# Patient Record
Sex: MALE | Race: WHITE | NOT HISPANIC OR LATINO | Employment: OTHER | URBAN - METROPOLITAN AREA
[De-identification: names, ages, dates, MRNs, and addresses within clinical notes are randomized per-mention and may not be internally consistent; named-entity substitution may affect disease eponyms.]

---

## 2017-01-01 ENCOUNTER — HOSPITAL ENCOUNTER (OUTPATIENT)
Dept: RADIOLOGY | Facility: HOSPITAL | Age: 82
Discharge: HOME | End: 2017-03-09
Attending: NURSE PRACTITIONER
Payer: MEDICARE

## 2017-01-01 ENCOUNTER — TELEPHONE (OUTPATIENT)
Dept: FAMILY MEDICINE | Facility: CLINIC | Age: 82
End: 2017-01-01

## 2017-01-01 ENCOUNTER — OFFICE VISIT (OUTPATIENT)
Dept: FAMILY MEDICINE | Facility: CLINIC | Age: 82
End: 2017-01-01
Payer: MEDICARE

## 2017-01-01 ENCOUNTER — TELEPHONE (OUTPATIENT)
Dept: NEUROLOGY | Facility: CLINIC | Age: 82
End: 2017-01-01

## 2017-01-01 ENCOUNTER — OFFICE VISIT (OUTPATIENT)
Dept: NEUROLOGY | Facility: CLINIC | Age: 82
End: 2017-01-01
Payer: MEDICARE

## 2017-01-01 ENCOUNTER — LAB VISIT (OUTPATIENT)
Dept: LAB | Facility: HOSPITAL | Age: 82
End: 2017-01-01
Attending: EMERGENCY MEDICINE
Payer: MEDICARE

## 2017-01-01 VITALS
HEIGHT: 68 IN | HEART RATE: 76 BPM | WEIGHT: 168.19 LBS | TEMPERATURE: 98 F | SYSTOLIC BLOOD PRESSURE: 114 MMHG | DIASTOLIC BLOOD PRESSURE: 66 MMHG | OXYGEN SATURATION: 99 % | BODY MASS INDEX: 25.49 KG/M2

## 2017-01-01 VITALS
WEIGHT: 171 LBS | HEART RATE: 77 BPM | DIASTOLIC BLOOD PRESSURE: 68 MMHG | SYSTOLIC BLOOD PRESSURE: 122 MMHG | BODY MASS INDEX: 23.16 KG/M2 | HEIGHT: 72 IN

## 2017-01-01 VITALS
BODY MASS INDEX: 23.05 KG/M2 | SYSTOLIC BLOOD PRESSURE: 100 MMHG | TEMPERATURE: 98 F | HEART RATE: 69 BPM | WEIGHT: 170.19 LBS | HEIGHT: 72 IN | DIASTOLIC BLOOD PRESSURE: 60 MMHG

## 2017-01-01 DIAGNOSIS — R30.0 DYSURIA: Primary | ICD-10-CM

## 2017-01-01 DIAGNOSIS — I48.0 PAROXYSMAL ATRIAL FIBRILLATION: ICD-10-CM

## 2017-01-01 DIAGNOSIS — R30.9 MICTURITION PAINFUL: ICD-10-CM

## 2017-01-01 DIAGNOSIS — F32.A DEPRESSION: ICD-10-CM

## 2017-01-01 DIAGNOSIS — G31.83 LEWY BODY DEMENTIA WITHOUT BEHAVIORAL DISTURBANCE: ICD-10-CM

## 2017-01-01 DIAGNOSIS — M25.521 PAIN IN RIGHT ELBOW: ICD-10-CM

## 2017-01-01 DIAGNOSIS — R60.0 LOCALIZED EDEMA: ICD-10-CM

## 2017-01-01 DIAGNOSIS — G20.A1 PARKINSON DISEASE: ICD-10-CM

## 2017-01-01 DIAGNOSIS — M25.421 SWELLING OF RIGHT ELBOW: ICD-10-CM

## 2017-01-01 DIAGNOSIS — G20.A1 DEMENTIA DUE TO PARKINSON'S DISEASE WITHOUT BEHAVIORAL DISTURBANCE: ICD-10-CM

## 2017-01-01 DIAGNOSIS — F02.80 LEWY BODY DEMENTIA WITHOUT BEHAVIORAL DISTURBANCE: ICD-10-CM
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
## 2017-01-01 DIAGNOSIS — G20.A1 COGNITIVE DEFICIT DUE TO PARKINSON'S DISEASE: ICD-10-CM

## 2017-01-01 DIAGNOSIS — F02.80 DEMENTIA DUE TO PARKINSON'S DISEASE WITHOUT BEHAVIORAL DISTURBANCE: ICD-10-CM

## 2017-01-01 DIAGNOSIS — S40.021A TRAUMATIC HEMATOMA OF RIGHT UPPER ARM, INITIAL ENCOUNTER: Primary | ICD-10-CM

## 2017-01-01 DIAGNOSIS — R53.81 PHYSICAL DEBILITY: ICD-10-CM

## 2017-01-01 DIAGNOSIS — S40.021A TRAUMATIC HEMATOMA OF RIGHT UPPER ARM, INITIAL ENCOUNTER: ICD-10-CM

## 2017-01-01 DIAGNOSIS — R47.01 PROGRESSIVE APHASIA: ICD-10-CM

## 2017-01-01 LAB
BACTERIA UR CULT: NO GROWTH
BILIRUB UR QL STRIP: NEGATIVE
CLARITY UR: CLEAR
COLOR UR: YELLOW
GLUCOSE UR QL STRIP: NEGATIVE
HGB UR QL STRIP: NEGATIVE
KETONES UR QL STRIP: NEGATIVE
LEUKOCYTE ESTERASE UR QL STRIP: NEGATIVE
NITRITE UR QL STRIP: NEGATIVE
PH UR STRIP: 7 [PH] (ref 5–8)
PROT UR QL STRIP: NEGATIVE
SP GR UR STRIP: 1.01 (ref 1–1.03)
URN SPEC COLLECT METH UR: NORMAL

## 2017-01-01 PROCEDURE — 1160F RVW MEDS BY RX/DR IN RCRD: CPT | Mod: S$GLB,,, | Performed by: NURSE PRACTITIONER

## 2017-01-01 PROCEDURE — 99999 PR PBB SHADOW E&M-EST. PATIENT-LVL II: CPT | Mod: PBBFAC,,, | Performed by: PSYCHIATRY & NEUROLOGY

## 2017-01-01 PROCEDURE — 99214 OFFICE O/P EST MOD 30 MIN: CPT | Mod: S$GLB,,, | Performed by: NURSE PRACTITIONER

## 2017-01-01 PROCEDURE — 76882 US LMTD JT/FCL EVL NVASC XTR: CPT | Mod: 26,59,RT, | Performed by: RADIOLOGY

## 2017-01-01 PROCEDURE — 1157F ADVNC CARE PLAN IN RCRD: CPT | Mod: S$GLB,,, | Performed by: NURSE PRACTITIONER

## 2017-01-01 PROCEDURE — 81003 URINALYSIS AUTO W/O SCOPE: CPT | Mod: PO

## 2017-01-01 PROCEDURE — 1126F AMNT PAIN NOTED NONE PRSNT: CPT | Mod: S$GLB,,, | Performed by: NURSE PRACTITIONER

## 2017-01-01 PROCEDURE — 99214 OFFICE O/P EST MOD 30 MIN: CPT | Mod: GW,S$GLB,, | Performed by: PSYCHIATRY & NEUROLOGY

## 2017-01-01 PROCEDURE — 99999 PR PBB SHADOW E&M-EST. PATIENT-LVL IV: CPT | Mod: PBBFAC,,, | Performed by: NURSE PRACTITIONER

## 2017-01-01 PROCEDURE — 87086 URINE CULTURE/COLONY COUNT: CPT

## 2017-01-01 PROCEDURE — 76882 US LMTD JT/FCL EVL NVASC XTR: CPT | Mod: TC,PO,RT

## 2017-01-01 PROCEDURE — 1159F MED LIST DOCD IN RCRD: CPT | Mod: S$GLB,,, | Performed by: NURSE PRACTITIONER

## 2017-01-01 PROCEDURE — 93971 EXTREMITY STUDY: CPT | Mod: 26,RT,, | Performed by: RADIOLOGY

## 2017-01-01 PROCEDURE — 93971 EXTREMITY STUDY: CPT | Mod: TC,PO,RT

## 2017-01-01 PROCEDURE — 1159F MED LIST DOCD IN RCRD: CPT | Mod: S$GLB,,, | Performed by: PSYCHIATRY & NEUROLOGY

## 2017-01-01 PROCEDURE — 1126F AMNT PAIN NOTED NONE PRSNT: CPT | Mod: S$GLB,,, | Performed by: PSYCHIATRY & NEUROLOGY

## 2017-01-01 PROCEDURE — 99499 UNLISTED E&M SERVICE: CPT | Mod: S$GLB,,, | Performed by: NURSE PRACTITIONER

## 2017-01-01 PROCEDURE — 99499 UNLISTED E&M SERVICE: CPT | Mod: S$GLB,,, | Performed by: PSYCHIATRY & NEUROLOGY

## 2017-01-01 RX ORDER — CITALOPRAM 20 MG/1
TABLET, FILM COATED ORAL
Qty: 90 TABLET | Refills: 0 | Status: SHIPPED | OUTPATIENT
Start: 2017-01-01

## 2017-01-01 RX ORDER — MUPIROCIN 20 MG/G
OINTMENT TOPICAL
Refills: 0 | COMMUNITY
Start: 2017-01-01 | End: 2017-01-01 | Stop reason: SDUPTHER

## 2017-01-01 RX ORDER — LATANOPROST 50 UG/ML
1 SOLUTION/ DROPS OPHTHALMIC NIGHTLY
COMMUNITY
Start: 2017-01-01

## 2017-01-01 RX ORDER — CARBIDOPA AND LEVODOPA 25; 250 MG/1; MG/1
TABLET ORAL
Qty: 270 TABLET | Refills: 0 | Status: SHIPPED | OUTPATIENT
Start: 2017-01-01 | End: 2017-01-01 | Stop reason: SDUPTHER

## 2017-01-01 RX ORDER — CARBIDOPA AND LEVODOPA 25; 250 MG/1; MG/1
1 TABLET ORAL 3 TIMES DAILY
Qty: 270 TABLET | Refills: 3 | Status: SHIPPED | OUTPATIENT
Start: 2017-01-01

## 2017-01-01 RX ORDER — CITALOPRAM 20 MG/1
TABLET, FILM COATED ORAL
Qty: 90 TABLET | Refills: 0 | Status: SHIPPED | OUTPATIENT
Start: 2017-01-01 | End: 2017-01-01 | Stop reason: SDUPTHER

## 2017-03-09 NOTE — PROGRESS NOTES
Subjective:       Patient ID: Gerardo Harding is a 81 y.o. male.    Chief Complaint: Hospital f/u (Patient fell 2 days ago and hit his right elbow,was sent to ER. Now elbow is swollen)    HPI Comments: 81 year old male with hx of dementia presents with swelling and abrasion to R lower arm after falling this afternoon. Wife states pt lost his balance while walking and landed on his R side. Pt c/o R arm pain. No other injuries. Presents with full ROM of R arm. Pt takes 325mg ASA daily. Last tetanus unknown.    Went down step at home - and veered to right and fell on his wife couldn't get him up, was going to Dr Foster office - he told his wife that his elbow hurt -  Went to [facility] and reviewed records from there.  They xrayed it showing no break   Wife has been putting bandaid on cut on elbow   Taking advil for the pain     Vitals:    03/09/17 1339   BP: 100/60   Pulse: 69   Temp: 98.4 °F (36.9 °C)     Review of Systems   Constitutional: Negative.  Negative for fatigue and fever.   HENT: Negative.    Eyes: Negative.    Respiratory: Negative.  Negative for cough and shortness of breath.    Cardiovascular: Negative.  Negative for chest pain.   Gastrointestinal: Negative.  Negative for abdominal pain, diarrhea and nausea.   Endocrine: Negative.    Genitourinary: Negative.  Negative for dysuria and hematuria.   Musculoskeletal: Positive for gait problem, joint swelling and myalgias.   Skin: Positive for wound. Negative for color change and rash.   Allergic/Immunologic: Negative.    Neurological: Positive for speech difficulty and light-headedness. Negative for numbness.   Hematological: Negative.    Psychiatric/Behavioral: Negative.        Past Medical History:   Diagnosis Date    *Atrial fibrillation     Atrial fibrillation     Glaucoma     Gout     Lewy body dementia     Parkinson disease     Lewy Body dementia     Objective:      Physical Exam   Constitutional: He is oriented to person, place, and time. He appears  well-developed.   HENT:   Head: Normocephalic and atraumatic.   Right Ear: External ear normal.   Left Ear: External ear normal.   Nose: Nose normal.   Mouth/Throat: Oropharynx is clear and moist.   Eyes: Conjunctivae and EOM are normal. Pupils are equal, round, and reactive to light.   Neck: Normal range of motion. Neck supple.   Cardiovascular: Normal rate, regular rhythm, normal heart sounds and intact distal pulses.  Exam reveals no friction rub.    No murmur heard.  Pulmonary/Chest: Effort normal and breath sounds normal. No respiratory distress. He has no wheezes.   Abdominal: Soft. Bowel sounds are normal.   Musculoskeletal:        Left elbow: He exhibits decreased range of motion, swelling and effusion. Tenderness found.        Left forearm: He exhibits tenderness, swelling and edema.   Difficulty with ambulation and frequent falls due to parkinson     See picture of left elbow traumatic hematoma    Neurological: He is alert and oriented to person, place, and time.   Skin: Skin is warm and dry.   Psychiatric: He has a normal mood and affect. His behavior is normal. Thought content normal.   Nursing note and vitals reviewed.              Assessment:       1. Traumatic hematoma of right upper arm, initial encounter    2. Pain in right elbow    3. Swelling of right elbow    4. Localized edema     5. Parkinson disease    6. 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 Physical debility    8. Dementia due to Parkinson's disease without behavioral disturbance    9. Paroxysmal atrial fibrillation        Plan:       Traumatic hematoma of right upper arm, initial encounter  -      Upper Extremity Veins Right; Future; Expected date: 3/9/17  -     US Extremity Non Vascular Limited Right; Future; Expected date: 3/9/17  -     Ambulatory referral to Home Health    Pain in right elbow  -      Upper Extremity Veins Right; Future; Expected date: 3/9/17  -     US Extremity Non Vascular Limited Right; Future;  Expected date: 3/9/17  -     Ambulatory referral to Home Health    Swelling of right elbow  -     US Upper Extremity Veins Right; Future; Expected date: 3/9/17  -     US Extremity Non Vascular Limited Right; Future; Expected date: 3/9/17  -     Ambulatory referral to Home Health    Localized edema   -     US Upper Extremity Veins Right; Future; Expected date: 3/9/17  -     US Extremity Non Vascular Limited Right; Future; Expected date: 3/9/17  -     Ambulatory referral to Home Health  Elbow wrapped for compression with 2 ace bandages     Parkinson disease  -     Ambulatory referral to Home Health    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    Discussed HHC - She was very agreeable to help with falls and dressing changes to his arm   Will send [facility] - as they were please with this company recently    Discussed dressing - Topical abx bactroban placed to arm and telfa and wrapped with 2 ace bandages from wrist passed elbow in order to aid with compression of fluid for hematoma  Discussed with wife can add ice to help  Discussed that he can take tylenol for the pain unless she feels he requires more - Since his parkinsons - he has difficulty with voicing his issues of pain.   She was comfortable with tylenol  Will send [facility] for assistance  Discussed that traumatic hematoma may take weeks to be reabsorbed.    FU if not better - or call with any questions

## 2017-03-09 NOTE — MR AVS SNAPSHOT
[facility]  [address] [facility] [address]  [place] [address]  Phone: [phone]  Fax: [phone]                  Gerardo Harding   3/9/2017 1:20 PM   Office Visit    Description:  Male : 1935   Provider:  Monie Sanchez NP   Department:  [facility]           Reason for Visit     Hospital f/u           Diagnoses this Visit        Comments    Traumatic hematoma of right upper arm, initial encounter    -  Primary     Pain in right elbow         Swelling of right elbow         Localized edema                To Do List           Goals (5 Years of Data)     None      [facility]     [facility] On Call Nurse Care Line -  Assistance  Registered nurses in the [facility] On Call Center provide clinical advisement, health education, appointment booking, and other advisory services.  Call for this free service at [phone].             Medications           Message regarding Medications     Verify the changes and/or additions to your medication regime listed below are the same as discussed with your clinician today.  If any of these changes or additions are incorrect, please notify your healthcare provider.             Verify that the below list of medications is an accurate representation of the medications you are currently taking.  If none reported, the list may be blank. If incorrect, please contact your healthcare provider. Carry this list with you in case of emergency.           Current Medications     aspirin 325 MG tablet Take 1 tablet (325 mg total) by mouth once daily.    carbidopa-levodopa  mg (SINEMET)  mg per tablet TAKE 1 TABLET BY MOUTH THREE TIMES DAILY    metoprolol tartrate (LOPRESSOR) 25 MG tablet Take 25 mg by mouth 2 (two) times daily.     multivitamin (THERAGRAN) per tablet Take 1 tablet by mouth once daily.    citalopram (CELEXA) 20 MG tablet TAKE 1 TABLET BY MOUTH EVERY DAY    polyethylene glycol (GLYCOLAX) 17 gram/dose powder Take 17  g by mouth once daily.           Clinical Reference Information           Your Vitals Were     BP Pulse Temp Height Weight BMI    100/60 69 98.4 °F (36.9 °C) (Oral) 6' (1.829 m) 77.2 kg (170 lb 3.1 oz) 23.08 kg/m2      Blood Pressure          Most Recent Value    BP  100/60      Allergies as of 3/9/2017     Amiodarone    Atorvastatin    Demerol [Meperidine]    Timoptic Ocudose  [Timolol Maleate (Pf)]      Immunizations Administered on Date of Encounter - 3/9/2017     None      Orders Placed During Today's Visit     Future Labs/Procedures Expected by Expires    US Extremity Non Vascular Limited Right  3/9/2017 3/9/2018    US Upper Extremity Veins Right  3/9/2017 3/9/2018      [facility] Sign-Up     Activating your [facility] account is as easy as 1-2-3!     1) Visit [email or web address], select Sign Up Now, enter this activation code and your date of birth, then select Next.  [email or web address]  Expires: 4/21/2017  8:50 PM      2) Create a username and password to use when you visit [facility] in the future and select a security question in case you lose your password and select Next.    3) Enter your e-mail address and click Sign Up!    Additional Information  If you have questions, please e-mail [email or web address] or call [phone] to talk to our [facility] staff. Remember, [facility] is NOT to be used for urgent needs. For medical emergencies, dial 911.         Language Assistance Services     ATTENTION: Language assistance services are available, free of charge. Please call [phone].      ATENCIÓN: Si habla español, tiene a yung disposición servicios gratuitos de asistencia lingüística. Llame al [phone].     CHÚ Ý: N?u b?n nói Ti?ng Vi?t, có các d?ch v? h? tr? ngôn ng? mi?n phí dành cho b?n. G?i s? [phone].         [facility] complies with applicable Federal civil rights laws and does not discriminate on the basis of race, color, national origin, age, disability, or sex.

## 2017-03-15 NOTE — TELEPHONE ENCOUNTER
Left direct callback # with sitter requesting callback from Ms. Harding to assist with scheduling f/u for Dr. Le or Lilia.

## 2017-03-15 NOTE — TELEPHONE ENCOUNTER
----- Message from aGyla Tavares sent at 3/14/2017 10:44 AM CDT -----  Contact: Zhane Harding  Wife called regarding scheduling a f/u appt with Dr. Le. Please contact [phone] (home)

## 2017-03-17 NOTE — TELEPHONE ENCOUNTER
Spoke to patient's wife, Khushi, who had been unaware of the appt scheduled on 3/1/17 with GLADYS Santoyo in [place] nor the change in providers from Dr. Le to GLADYS Santoyo. I explained the relationship amongst the providers with regards to treating movement disorders as a team. She had not been made aware of this and was requesting for her  to see Dr. Le again in [place]. Soonest appt available was 6/1/17; Ms. Puri accepted. New appt letter to be sent.

## 2017-04-06 NOTE — TELEPHONE ENCOUNTER
----- Message from Daina Kelly sent at 4/6/2017  1:27 PM CDT -----  Contact: Lili flores/ [facility]  Khushi wants to speak with a nurse regarding the patient being seen on Monday. Please call back at [phone] (home)

## 2017-04-06 NOTE — TELEPHONE ENCOUNTER
Gerardo a fall 3 weks ago and ultrasound was taken it is not going down as it should.   He will come back in to see kaiden chávez NP for re dung.

## 2017-04-10 NOTE — TELEPHONE ENCOUNTER
Pt wife spoke to Kinjal she would like to talk to you about pt being referred to hospice, wife would like to speak to you alone.Please advise.

## 2017-04-10 NOTE — PROGRESS NOTES
Subjective:       Patient ID: Gerardo Harding is a 82 y.o. male.    Chief Complaint: Elbow Injury (elbow it's better but not cure yet. Been having a lot of falls lately. Seems he's losing strenght to legs.Also not feeling any pain whatsoever)    HPI Comments: Seen 4 weeks ago for fall - traumatic hematoma to elbow  [facility] ordered and has been followed - not much PT at home       Wife reports patient falling at home more frequently- she states that she is right by his side and  He is loosing his balance       Elbow Injury   This is a recurrent problem. The current episode started 1 to 4 weeks ago. The problem has been gradually improving. Associated symptoms include myalgias. Pertinent negatives include no abdominal pain, anorexia, arthralgias, change in bowel habit, chest pain, chills, congestion, coughing, diaphoresis, fatigue, fever, headaches, joint swelling, nausea, neck pain, numbness, rash, sore throat, swollen glands, urinary symptoms, vertigo, visual change, vomiting or weakness.   Fall   The accident occurred more than 1 week ago (3 falls per week ). The fall occurred while walking. There was no blood loss. Pain location: chest, left arm, right arm, right leg  The symptoms are aggravated by ambulation. Pertinent negatives include no abdominal pain, bowel incontinence, fever, headaches, hearing loss, hematuria, loss of consciousness, nausea, numbness, tingling, visual change or vomiting. The treatment provided no relief.     Vitals:    04/10/17 1504   BP: 114/66   Pulse: 76   Temp: 97.9 °F (36.6 °C)     Review of Systems   Constitutional: Negative for chills, diaphoresis, fatigue and fever.   HENT: Negative.  Negative for congestion and sore throat.    Eyes: Negative.    Respiratory: Negative for cough and shortness of breath.    Cardiovascular: Negative for chest pain.   Gastrointestinal: Negative for abdominal pain, anorexia, bowel incontinence, change in bowel habit, diarrhea, nausea and vomiting.    Endocrine: Negative.    Genitourinary: Negative for dysuria and hematuria.   Musculoskeletal: Positive for myalgias. Negative for arthralgias, joint swelling and neck pain.   Skin: Negative for color change and rash.   Allergic/Immunologic: Negative.    Neurological: Negative.  Negative for vertigo, tingling, loss of consciousness, weakness, numbness and headaches.   Hematological: Negative.        Past Medical History:   Diagnosis Date    *Atrial fibrillation     Atrial fibrillation     Glaucoma     Gout     Lewy body dementia     Parkinson disease     Lewy Body dementia     Objective:      Physical Exam   Constitutional: He is oriented to person, place, and time. He appears well-developed and well-nourished.   HENT:   Head: Normocephalic and atraumatic.   Mouth/Throat: Oropharynx is clear and moist.   Eyes: Conjunctivae and EOM are normal. Pupils are equal, round, and reactive to light.   Neck: Normal range of motion.   Cardiovascular: Normal rate.    Pulmonary/Chest: Effort normal.   Musculoskeletal: Normal range of motion.        Right forearm: He exhibits swelling. He exhibits no laceration.        Arms:  Neurological: He is alert and oriented to person, place, and time.   Skin: Skin is warm and dry. No erythema.        Psychiatric: He has a normal mood and affect. His behavior is normal. Judgment and thought content normal.   Nursing note and vitals reviewed.                      Assessment:       1. Traumatic hematoma of right upper arm, initial encounter    2. Fall, subsequent encounter    3. Physical debility    4. Swelling of right elbow    5. Lewy body dementia without behavioral disturbance    6. 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

## 2017-04-11 NOTE — TELEPHONE ENCOUNTER
I spoke at length with Khushi, his wife.  She has not was [facility] hospice, but is ambivalent about whether or not to proceed with hospice referral.  She has a number of unanswered questions, mostly around timing.    Hospice philosophy, financing, a were discussed at length.    She is going to think about this a bit more, perhaps talk to another hospice before making any decisions.    MIN

## 2017-04-11 NOTE — PROGRESS NOTES
Patient, Gerardo Harding (MRN #[id number]), presented with a recent Platelet count less than 150 K/uL consistent with the definition of thrombocytopenia (ICD10 - D69.6).    Platelets   Date Value Ref Range Status   09/02/2016 131 (L) 150 - 350 K/uL Final     The patient's thrombocytopenia was monitored, evaluated, addressed and/or treated. This addendum to the medical record is made on 04/11/2017.

## 2017-04-21 NOTE — TELEPHONE ENCOUNTER
[facility] wife states he is getting up every 15 minutes to urinate and urine is dark, encourage nurse to get UA and faxed results to clinic. Please advise. ALEX

## 2017-04-21 NOTE — TELEPHONE ENCOUNTER
----- Message from Griselda Sales sent at 4/21/2017 10:26 AM CDT -----  Contact: [facility]   Needs UA orders   Pt  Having SX   Call back

## 2017-04-23 NOTE — TELEPHONE ENCOUNTER
This urinalysis was done.  As of Sunday morning, the urine culture is negative.  Please check with them Monday or Tuesday to see how he is doing.

## 2017-04-26 NOTE — TELEPHONE ENCOUNTER
Spoke to pt wife she stated pt has poor balance, sleeping more and eating well. Overall doing well much as to be expected. Really appreciated follow up. OGI.

## 2017-04-28 NOTE — TELEPHONE ENCOUNTER
----- Message from Nitesh Villeda sent at 4/28/2017 12:44 PM CDT -----  Contact: [facility]  [phone]  Patient need orders for OT for safety training in the home.  Thanks!

## 2017-04-28 NOTE — TELEPHONE ENCOUNTER
Spoke with wife he does have some balance issues. And needs to have OT in the home.     Spoke with Keshia. She would like for pt to have OT for pt. Nurses aide was with him this am and felt that pt would benefit from this service.

## 2017-05-01 NOTE — TELEPHONE ENCOUNTER
Spoke with keli Murrell RN at [facility] and read back okay for pt to have PT for his balance issues. Dx G20

## 2017-05-01 NOTE — TELEPHONE ENCOUNTER
I am sure he would benefit from physical therapy for strength and balance.    Please have PT evaluate and treat Gerardo.    Dx:  G20    MIN

## 2017-06-01 PROBLEM — R47.01 PROGRESSIVE APHASIA: Status: ACTIVE | Noted: 2017-01-01

## 2017-06-01 NOTE — PROGRESS NOTES
"Gerardo Harding I. Chief Complaints during this visit:  f/u Patient visit for  LBD      Referring Physician:     No referring provider defined for this encounter.       Primary Care Physician:  Greg Cosby Jr, MD  [address] [place] [address]      History of present illness:   82 y.o.  M seen in f/u for possible LBD or FTD, accompanied by wife.  Now in hospice care and wife says that this has been a very good decision.    Still can toilet himself, but needs help to transfer.  Cannot walk independently.    Interval history 11/17/16:  Wife gave me a printed assessment of his current symptoms.  In good news, he can still independently use bathroom and bedside toilet.  No falls.      Less and less engaged in conversations, speech is slow and sparse.  Sleeping more day and night.  Balance is worse when in usual places.  Rare dysphagia with liquids.      Interval history 7/15/16:  No falls in 2 weeks and this includes him getting up at night to use bathroom.    From my note 1/8/16:  Fell in November and spent a week in [facility].  Had cellulitis in knee, now resolved.  Did well through anesthesia.  Now using a walker, no falls.  Needing more assistance with ADL's, but wife also needing to be present to help, anyway (like with shower).  Able to eat with hands better than fork.  Needs help dressing.  He says he doesn't sleep well, but wife says she is rarely awaken.   Responds in yes/no answers.  Very few expressions, though he smiled at an old friend recently.    From my note 6/4/15:  Dr. Cosby increased celexa dose and there has been noticeable improvements (wife says he gets out of bed, has more expression).  When asked how he feels, he says, "good." (see last visits response).    From my note 3/13/15:  Since reducing exelon patch to 4.6 daily balance is better and has no falls since last visit.  He found levodopa helps his balance, but was causing constipation, so went to bid (10am " "and 10pm).  Depression is still severe.  He readily admits that he would "rather be dead."  No plans for suicide.    From my note 1/9/15:  Previously followed by Dr. Perdomo.  Wife first perceived slowing physically about 3 years ago.  Cognitive much slower and does not speak much.    He complains of difficulty falling asleep, staying sleeping at night, but will fall asleep during day in chair.    Per notes from [facility], he did appreciate improvement in movement on sinemet.  She increased to qid in 6/2013, but he did not have appreciable effect and [facility] subsequently stopped it.  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   Has fallen a couple times in past 6 months.  Intermittently complains of back pain.  He has lost about 20 lbs in last 6 months.  Swallow study was "ok" but his appetite is poor and takes forever to eat anything.  PCP tried zoloft for his severe depression, but patient says it made him feel jittery.      From [facility]'s last note 3/2014:  He was last seen in October 2013. He is accompanied to this visit by his wife. At his last visit, he was asked to undergo a Jun scan and do Neuropsych testing. His Jun scan was positive for a parkinsonian syndrome and his Neuropsych testing was consistent with LBD. He has also seen Freda Islas since his psych testing. He and his wife do not think they will be going back to see Freda. He feels like he is slowing down and he feels like his speech difficulty is progressing. He did [employer] in the past but isn't doing the exercises at home. He is going to the gym and is still doing the PD exercise class at [employer]. He did mention that he is having trouble with gout.     From [facility] consult 10/2013:          II.  Review of systems:  As in HPI, otherwise, balance 3 systems reviewed and are negative.    III.  Past Medical History:   Diagnosis Date    *Atrial fibrillation     Atrial fibrillation     " Glaucoma     Gout     Lewy body dementia     Parkinson disease     Lewy Body dementia     Family History   Problem Relation Age of Onset    Dementia Mother     Cancer Father      lung ca    Heart disease Sister     Heart disease Brother     Heart disease Brother     Heart disease Brother     Diabetes Daughter      Social History     Social History    Marital status:      Spouse name: N/A    Number of children: N/A    Years of education: N/A     Social History Main Topics    Smoking status: Never Smoker    Smokeless tobacco: Never Used    Alcohol use No    Drug use: No    Sexual activity: Not Currently     Other Topics Concern    None     Social History Narrative    None        Current Outpatient Prescriptions on File Prior to Visit   Medication Sig Dispense Refill    acetaminophen (TYLENOL) 650 MG Supp Place 650 mg rectally every 4 (four) hours as needed (pain or elevated temperature). Indications: Fever, Pain      atropine 1% (ISOPTO ATROPINE) 1 % Drop Place 2 drops under the tongue every 2 (two) hours as needed (excess respiratory secretions).      carbidopa-levodopa  mg (SINEMET)  mg per tablet TAKE 1 TABLET BY MOUTH THREE TIMES DAILY 270 tablet 0    citalopram (CELEXA) 20 MG tablet TAKE 1 TABLET BY MOUTH EVERY DAY 90 tablet 0    haloperidol (HALDOL) 2 mg/mL solution Take 0.5 mg by mouth every 4 (four) hours as needed (anxiety/agitation). administer 0.25 ml      lactulose 10 gram/15 ml (CHRONULAC) 10 gram/15 mL (15 mL) solution Take 10 g by mouth daily as needed (constipation). Indications: Constipation      metoprolol tartrate (LOPRESSOR) 25 MG tablet Take 25 mg by mouth 2 (two) times daily.       morphine 20 mg/mL concentrated syringe Take 5 mg by mouth every 2 (two) hours as needed for Pain. pain or shortness of breath  administer 0.25 ml      MULTIVIT,TX,IRON/CALCM/FA/MINS (MULTIVITAMIN) Tab Take 1 tablet by mouth once daily. Discovered patient on multivitamin.  "Unknown start date.  Indications: health      mupirocin calcium 2% (BACTROBAN) 2 % cream Apply 1 g topically Daily.      ondansetron (ZOFRAN) 4 MG tablet Take 4 mg by mouth every 4 (four) hours as needed for Nausea. sublingual      polyethylene glycol (GLYCOLAX) 17 gram/dose powder Take 17 g by mouth once daily. 510 g 0    temazepam (RESTORIL) 15 mg Cap Take 15 mg by mouth nightly as needed (for sleep). Indications: Insomnia       No current facility-administered medications on file prior to visit.        PRIOR NEURO MEDICATIONS TRIED:  Sinemet; exelon >4.5 caused worsening gait    Allergies   Allergen Reactions    Lorazepam Other (See Comments)     Wife stated her  had worsened anxiety and it was "really bad". This reaction happened approximately 1 year ago (April 2016).    Amiodarone      Other reaction(s): loss of appetite  Other reaction(s): Rash    Atorvastatin      Other reaction(s): Muscle pain    Demerol [Meperidine]      Due to Parkinson's    Timoptic Ocudose  [Timolol Maleate (Pf)]      Other reaction(s): irregular heart beat         IV. Physical Exam (Includes Motor Unified Parkinson's Disease Rating Scale 2008)  Patient taking PD meds?   Yes  Time since last dose:      2 hours    Vitals:    06/01/17 1346   BP: 122/68   BP Location: Left arm   Patient Position: Sitting   BP Method: Automatic   Pulse: 77   Weight: 77.6 kg (171 lb)   Height: 6' (1.829 m)     Wt Readings from Last 10 Encounters:   06/01/17 77.6 kg (171 lb)   04/10/17 76.3 kg (168 lb 3.4 oz)   03/11/17 77.1 kg (170 lb)   03/09/17 77.2 kg (170 lb 3.1 oz)   03/07/17 75.1 kg (165 lb 9.1 oz)   12/14/16 78 kg (171 lb 15.3 oz)   11/17/16 77.7 kg (171 lb 4.8 oz)   09/27/16 77.7 kg (171 lb 4.8 oz)   09/12/16 79 kg (174 lb 2.6 oz)   09/03/16 79 kg (174 lb 2.6 oz)         General appearance: Well nourished, well developed, no acute distress         -------------------------------------------------------------  Affect: full    "   Orientation to time & place:  Oriented to time, place, person and situation       Attention & concentration:  Slow to respond, but does respond     Memory:  3/3 at 5 minutes  Language:  Speaks only when spoken to; able to name and repeat       Fund of knowledge:  Aware of some current events     Stooped posture, moderate-severe bradykinesia  Ambulates without difficulty, but slow.     Toe-tapping on right:  3: Moderate: Any of the following: a) more than 5 interruptions during movement or at least one longer arrest (freeze) in ongoing movement; b) moderate slowing; c) the amplitude decrements starting after the motion.   Gait:  3: Moderate: Requires an assistance device for safe walking (walking stick, walker) but not a person.   Stand up:  4: Severe: Unable to arise without help.       V.  Laboratory/ Radiological Data: no new    From my note 1/9/15:  MRI brain 2012:  Unremarkable (I agree)                 neuropsych testing 2/18/14:  SUMMARY AND RECOMMENDATIONS:  Mr. Harding was referred for Neuropsychological Evaluation on an outpatient basis to evaluate cognitive functioning. His general cognitive abilities as assessed by the RBANS were in the moderately impaired range, with severely impaired attention; moderately impaired immediate verbal memory and visuospatial/constructional abilities; and mildly impaired language and delayed memory. Further assessment of specific cognitive abilities revealed average temporal orientation, above average naming, severely impaired verbal fluency and auditory comprehension, borderline facial recognition, mildly impaired constructional ability, mildly to moderately impaired abstract reasoning, and very severely impaired psychomotor speed and mental flexibility. Additional memory assessment revealed low average immediate and delayed memory. Personality test data suggested the presence of severe depression and moderate anxiety. Neuropsychological testing is consistent with mild  dementia primarily affecting attention, visuospatial/constructional abilities, verbal fluency, auditory comprehension, abstract reasoning, psychomotor speed, and mental flexibility, with depression, anxiety, hallucinations, and delusions. Immediate and delayed memory were variable relatively spared, with performances in the low average and mildly or moderately impaired range. The pattern of data is most consistent with dementia with Lewy bodies given the hallucinations and impairment in frontal abilities, although the impairment in visuospatial/constructional abilities is common in Parkinsons picture. He will see a psychiatrist for consultation regarding medication and a  for psychotherapy to address depression/anxiety/psychotic symptoms.      VI. Medical Decision Making  Diagnosis: Parkinsonism, cognitive impairment, depression             Assessment:    1.  parkinsonism (severe bradyphrenia and bradykinesia) with mild levodopa effect   2.  Progressive aphasia (FTD?)  3.  Severe depression, controlled     Treatment plan:  1.  No changes  2.  Consider ritalin or adderall                                  Return in about 6 months (around 12/1/2017).

## 2017-12-27 ENCOUNTER — TELEPHONE (OUTPATIENT)
Dept: FAMILY MEDICINE | Facility: CLINIC | Age: 82
End: 2017-12-27